# Patient Record
Sex: FEMALE | Race: ASIAN | NOT HISPANIC OR LATINO
[De-identification: names, ages, dates, MRNs, and addresses within clinical notes are randomized per-mention and may not be internally consistent; named-entity substitution may affect disease eponyms.]

---

## 2019-01-22 PROBLEM — Z00.00 ENCOUNTER FOR PREVENTIVE HEALTH EXAMINATION: Status: ACTIVE | Noted: 2019-01-22

## 2019-03-13 ENCOUNTER — APPOINTMENT (OUTPATIENT)
Dept: NEUROLOGY | Facility: CLINIC | Age: 39
End: 2019-03-13
Payer: MEDICAID

## 2019-03-13 VITALS
BODY MASS INDEX: 22.5 KG/M2 | HEIGHT: 62.99 IN | WEIGHT: 127 LBS | DIASTOLIC BLOOD PRESSURE: 78 MMHG | HEART RATE: 77 BPM | SYSTOLIC BLOOD PRESSURE: 112 MMHG

## 2019-03-13 DIAGNOSIS — M79.601 PAIN IN RIGHT ARM: ICD-10-CM

## 2019-03-13 DIAGNOSIS — Z78.9 OTHER SPECIFIED HEALTH STATUS: ICD-10-CM

## 2019-03-13 DIAGNOSIS — Z82.61 FAMILY HISTORY OF ARTHRITIS: ICD-10-CM

## 2019-03-13 DIAGNOSIS — Z72.0 TOBACCO USE: ICD-10-CM

## 2019-03-13 PROCEDURE — 99244 OFF/OP CNSLTJ NEW/EST MOD 40: CPT

## 2019-03-13 NOTE — HISTORY OF PRESENT ILLNESS
[FreeTextEntry1] : The patient came with no records. \par \par 38-year-old woman who is referred by Dr. Greene for a one-year duration of right arm pain that starts in her right posterior shoulder continues to the lateral olecranon, medial forearm and digits 4 and 5 in her hand. She reports no history of trauma to the area or any color changes. Lifting things seems to bring it on. She had MRI of the C-spine which was apparently normal. She also had ultrasound of her shoulder as well that was unremarkable. There is also a joint pain, swelling in the distribution described above. \par \par

## 2019-03-13 NOTE — PHYSICAL EXAM
[General Appearance - Alert] : alert [General Appearance - Well Developed] : well developed [Oriented To Time, Place, And Person] : oriented to person, place, and time [Person] : oriented to person [Place] : oriented to place [Time] : oriented to time [Short Term Intact] : short term memory intact [Span Intact] : the attention span was normal [Naming Objects] : no difficulty naming common objects [Fluency] : fluency intact [Current Events] : adequate knowledge of current events [Cranial Nerves Optic (II)] : visual acuity intact bilaterally,  visual fields full to confrontation, pupils equal round and reactive to light [Cranial Nerves Oculomotor (III)] : extraocular motion intact [Cranial Nerves Trigeminal (V)] : facial sensation intact symmetrically [Cranial Nerves Facial (VII)] : face symmetrical [Cranial Nerves Vestibulocochlear (VIII)] : hearing was intact bilaterally [Cranial Nerves Glossopharyngeal (IX)] : tongue and palate midline [Cranial Nerves Accessory (XI - Cranial And Spinal)] : head turning and shoulder shrug symmetric [Cranial Nerves Hypoglossal (XII)] : there was no tongue deviation with protrusion [Motor Tone] : muscle tone was normal in all four extremities [Motor Strength] : muscle strength was normal in all four extremities [Sensation Tactile Decrease] : light touch was intact [Sensation Pain / Temperature Decrease] : pain and temperature was intact [Coordination - Dysmetria Impaired Finger-to-Nose Bilateral] : not present [2+] : Patella left 2+ [FreeTextEntry6] : 5/5 in deltoid, biceps, triceps, wrist extension and flexion, finger flexion and extension. Some weakness in adductor digiti minimi on the right side.   [Extraocular Movements] : extraocular movements were intact [Hearing Threshold Finger Rub Not Rusk] : hearing was normal [Full Pulse] : the pedal pulses are present [Abnormal Walk] : normal gait [] : no rash

## 2019-03-13 NOTE — DISCUSSION/SUMMARY
[FreeTextEntry1] : 38-year-old woman who presents with right arm pain that originates from her shoulder. We'll try to obtain records from previous neurologist as to the testing that was performed. Will also check MRI of the brachial plexus to evaluate for any lower trunk pathology. Will have her return for an EMG of the upper extremities to evaluate for a brachial plexopathy versus ulnar neuropathy. \par \par Patient was advised to continue to monitor for neurologic symptoms and to notify my office or go to the nearest emergency room if there are any changes. Neurologic issues were discussed with the patient. All questions were answered to complete satisfaction. Education was provided to the patient during this encounter.\par Side effects of the above medications were discussed in detail including but not limited to applicable black box warning and teratogenicity as appropriate. \par Patient was advised to bring previous records to my office. \par \par

## 2019-03-14 LAB
BUN SERPL-MCNC: 15 MG/DL
CREAT SERPL-MCNC: 0.55 MG/DL

## 2019-04-05 ENCOUNTER — FORM ENCOUNTER (OUTPATIENT)
Age: 39
End: 2019-04-05

## 2019-04-06 ENCOUNTER — APPOINTMENT (OUTPATIENT)
Dept: MRI IMAGING | Facility: CLINIC | Age: 39
End: 2019-04-06
Payer: MEDICAID

## 2019-04-06 ENCOUNTER — OUTPATIENT (OUTPATIENT)
Dept: OUTPATIENT SERVICES | Facility: HOSPITAL | Age: 39
LOS: 1 days | End: 2019-04-06
Payer: COMMERCIAL

## 2019-04-06 DIAGNOSIS — M79.601 PAIN IN RIGHT ARM: ICD-10-CM

## 2019-04-06 PROCEDURE — A9585: CPT

## 2019-04-06 PROCEDURE — 71552 MRI CHEST W/O & W/DYE: CPT

## 2019-04-06 PROCEDURE — 71552 MRI CHEST W/O & W/DYE: CPT | Mod: 26

## 2019-05-22 ENCOUNTER — APPOINTMENT (OUTPATIENT)
Dept: NEUROLOGY | Facility: CLINIC | Age: 39
End: 2019-05-22

## 2022-01-06 ENCOUNTER — APPOINTMENT (OUTPATIENT)
Dept: NEUROLOGY | Facility: CLINIC | Age: 42
End: 2022-01-06